# Patient Record
Sex: FEMALE | Race: OTHER | NOT HISPANIC OR LATINO
[De-identification: names, ages, dates, MRNs, and addresses within clinical notes are randomized per-mention and may not be internally consistent; named-entity substitution may affect disease eponyms.]

---

## 2020-03-10 PROBLEM — Z00.00 ENCOUNTER FOR PREVENTIVE HEALTH EXAMINATION: Status: ACTIVE | Noted: 2020-03-10

## 2020-03-17 ENCOUNTER — APPOINTMENT (OUTPATIENT)
Dept: ANTEPARTUM | Facility: CLINIC | Age: 35
End: 2020-03-17
Payer: COMMERCIAL

## 2020-03-17 PROCEDURE — 76813 OB US NUCHAL MEAS 1 GEST: CPT

## 2020-04-14 ENCOUNTER — APPOINTMENT (OUTPATIENT)
Dept: ANTEPARTUM | Facility: CLINIC | Age: 35
End: 2020-04-14
Payer: COMMERCIAL

## 2020-04-14 PROCEDURE — 76811 OB US DETAILED SNGL FETUS: CPT

## 2020-05-19 ENCOUNTER — APPOINTMENT (OUTPATIENT)
Dept: ANTEPARTUM | Facility: CLINIC | Age: 35
End: 2020-05-19
Payer: COMMERCIAL

## 2020-05-19 PROCEDURE — 76816 OB US FOLLOW-UP PER FETUS: CPT

## 2020-07-09 ENCOUNTER — APPOINTMENT (OUTPATIENT)
Dept: ANTEPARTUM | Facility: CLINIC | Age: 35
End: 2020-07-09
Payer: COMMERCIAL

## 2020-07-09 PROCEDURE — 76816 OB US FOLLOW-UP PER FETUS: CPT

## 2020-07-27 ENCOUNTER — APPOINTMENT (OUTPATIENT)
Dept: ANTEPARTUM | Facility: CLINIC | Age: 35
End: 2020-07-27
Payer: COMMERCIAL

## 2020-07-27 PROCEDURE — 76819 FETAL BIOPHYS PROFIL W/O NST: CPT

## 2020-08-06 ENCOUNTER — APPOINTMENT (OUTPATIENT)
Dept: ANTEPARTUM | Facility: CLINIC | Age: 35
End: 2020-08-06
Payer: COMMERCIAL

## 2020-08-06 PROCEDURE — 76816 OB US FOLLOW-UP PER FETUS: CPT

## 2020-08-20 ENCOUNTER — APPOINTMENT (OUTPATIENT)
Dept: ANTEPARTUM | Facility: CLINIC | Age: 35
End: 2020-08-20
Payer: COMMERCIAL

## 2020-08-20 PROCEDURE — 76818 FETAL BIOPHYS PROFILE W/NST: CPT

## 2020-08-27 ENCOUNTER — APPOINTMENT (OUTPATIENT)
Dept: ANTEPARTUM | Facility: CLINIC | Age: 35
End: 2020-08-27
Payer: COMMERCIAL

## 2020-08-27 PROCEDURE — 76819 FETAL BIOPHYS PROFIL W/O NST: CPT

## 2020-09-03 ENCOUNTER — APPOINTMENT (OUTPATIENT)
Dept: ANTEPARTUM | Facility: CLINIC | Age: 35
End: 2020-09-03
Payer: COMMERCIAL

## 2020-09-03 PROCEDURE — 76817 TRANSVAGINAL US OBSTETRIC: CPT

## 2020-09-10 ENCOUNTER — APPOINTMENT (OUTPATIENT)
Dept: ANTEPARTUM | Facility: CLINIC | Age: 35
End: 2020-09-10
Payer: COMMERCIAL

## 2020-09-10 PROCEDURE — 76816 OB US FOLLOW-UP PER FETUS: CPT

## 2020-09-17 ENCOUNTER — OUTPATIENT (OUTPATIENT)
Dept: OUTPATIENT SERVICES | Facility: HOSPITAL | Age: 35
LOS: 1 days | End: 2020-09-17
Payer: COMMERCIAL

## 2020-09-17 ENCOUNTER — APPOINTMENT (OUTPATIENT)
Dept: ANTEPARTUM | Facility: CLINIC | Age: 35
End: 2020-09-17
Payer: COMMERCIAL

## 2020-09-17 DIAGNOSIS — O26.899 OTHER SPECIFIED PREGNANCY RELATED CONDITIONS, UNSPECIFIED TRIMESTER: ICD-10-CM

## 2020-09-17 DIAGNOSIS — Z3A.00 WEEKS OF GESTATION OF PREGNANCY NOT SPECIFIED: ICD-10-CM

## 2020-09-17 PROCEDURE — 99203 OFFICE O/P NEW LOW 30 MIN: CPT

## 2020-09-17 PROCEDURE — 76819 FETAL BIOPHYS PROFIL W/O NST: CPT

## 2020-09-17 PROCEDURE — 76981 USE PARENCHYMA: CPT | Mod: 26

## 2020-09-17 PROCEDURE — 99214 OFFICE O/P EST MOD 30 MIN: CPT

## 2020-09-21 ENCOUNTER — OUTPATIENT (OUTPATIENT)
Dept: OUTPATIENT SERVICES | Facility: HOSPITAL | Age: 35
LOS: 1 days | End: 2020-09-21
Payer: COMMERCIAL

## 2020-09-21 DIAGNOSIS — Z01.818 ENCOUNTER FOR OTHER PREPROCEDURAL EXAMINATION: ICD-10-CM

## 2020-09-21 LAB
APTT BLD: 23.8 SEC — LOW (ref 27.5–35.5)
BLD GP AB SCN SERPL QL: NEGATIVE — SIGNIFICANT CHANGE UP
BLD GP AB SCN SERPL QL: NEGATIVE — SIGNIFICANT CHANGE UP
HCT VFR BLD CALC: 37.9 % — SIGNIFICANT CHANGE UP (ref 34.5–45)
HGB BLD-MCNC: 12.5 G/DL — SIGNIFICANT CHANGE UP (ref 11.5–15.5)
INR BLD: 0.9 — SIGNIFICANT CHANGE UP (ref 0.88–1.16)
MCHC RBC-ENTMCNC: 29.8 PG — SIGNIFICANT CHANGE UP (ref 27–34)
MCHC RBC-ENTMCNC: 33 GM/DL — SIGNIFICANT CHANGE UP (ref 32–36)
MCV RBC AUTO: 90.2 FL — SIGNIFICANT CHANGE UP (ref 80–100)
NRBC # BLD: 0 /100 WBCS — SIGNIFICANT CHANGE UP (ref 0–0)
PLATELET # BLD AUTO: 268 K/UL — SIGNIFICANT CHANGE UP (ref 150–400)
PROTHROM AB SERPL-ACNC: 10.8 SEC — SIGNIFICANT CHANGE UP (ref 10.6–13.6)
RBC # BLD: 4.2 M/UL — SIGNIFICANT CHANGE UP (ref 3.8–5.2)
RBC # FLD: 13.7 % — SIGNIFICANT CHANGE UP (ref 10.3–14.5)
RH IG SCN BLD-IMP: POSITIVE — SIGNIFICANT CHANGE UP
RH IG SCN BLD-IMP: POSITIVE — SIGNIFICANT CHANGE UP
WBC # BLD: 8.05 K/UL — SIGNIFICANT CHANGE UP (ref 3.8–10.5)
WBC # FLD AUTO: 8.05 K/UL — SIGNIFICANT CHANGE UP (ref 3.8–10.5)

## 2020-09-21 PROCEDURE — 86900 BLOOD TYPING SEROLOGIC ABO: CPT

## 2020-09-21 PROCEDURE — 85730 THROMBOPLASTIN TIME PARTIAL: CPT

## 2020-09-21 PROCEDURE — 86850 RBC ANTIBODY SCREEN: CPT

## 2020-09-21 PROCEDURE — 85610 PROTHROMBIN TIME: CPT

## 2020-09-21 PROCEDURE — 86901 BLOOD TYPING SEROLOGIC RH(D): CPT

## 2020-09-21 PROCEDURE — 85027 COMPLETE CBC AUTOMATED: CPT

## 2020-09-22 ENCOUNTER — TRANSCRIPTION ENCOUNTER (OUTPATIENT)
Age: 35
End: 2020-09-22

## 2020-09-23 ENCOUNTER — RESULT REVIEW (OUTPATIENT)
Age: 35
End: 2020-09-23

## 2020-09-23 ENCOUNTER — INPATIENT (INPATIENT)
Facility: HOSPITAL | Age: 35
LOS: 3 days | Discharge: HOME HEALTH | End: 2020-09-27
Attending: OBSTETRICS & GYNECOLOGY | Admitting: OBSTETRICS & GYNECOLOGY
Payer: COMMERCIAL

## 2020-09-23 VITALS — HEIGHT: 66 IN | WEIGHT: 163.14 LBS

## 2020-09-23 LAB — GLUCOSE BLDC GLUCOMTR-MCNC: 83 MG/DL — SIGNIFICANT CHANGE UP (ref 70–99)

## 2020-09-23 PROCEDURE — 88307 TISSUE EXAM BY PATHOLOGIST: CPT | Mod: 26

## 2020-09-23 RX ORDER — ONDANSETRON 8 MG/1
4 TABLET, FILM COATED ORAL EVERY 6 HOURS
Refills: 0 | Status: DISCONTINUED | OUTPATIENT
Start: 2020-09-23 | End: 2020-09-27

## 2020-09-23 RX ORDER — FAMOTIDINE 10 MG/ML
20 INJECTION INTRAVENOUS ONCE
Refills: 0 | Status: COMPLETED | OUTPATIENT
Start: 2020-09-23 | End: 2020-09-23

## 2020-09-23 RX ORDER — SODIUM CHLORIDE 9 MG/ML
1000 INJECTION, SOLUTION INTRAVENOUS ONCE
Refills: 0 | Status: COMPLETED | OUTPATIENT
Start: 2020-09-23 | End: 2020-09-23

## 2020-09-23 RX ORDER — OXYCODONE HYDROCHLORIDE 5 MG/1
5 TABLET ORAL ONCE
Refills: 0 | Status: DISCONTINUED | OUTPATIENT
Start: 2020-09-23 | End: 2020-09-27

## 2020-09-23 RX ORDER — MORPHINE SULFATE 50 MG/1
0.3 CAPSULE, EXTENDED RELEASE ORAL ONCE
Refills: 0 | Status: DISCONTINUED | OUTPATIENT
Start: 2020-09-23 | End: 2020-09-27

## 2020-09-23 RX ORDER — NALOXONE HYDROCHLORIDE 4 MG/.1ML
0.1 SPRAY NASAL
Refills: 0 | Status: DISCONTINUED | OUTPATIENT
Start: 2020-09-23 | End: 2020-09-27

## 2020-09-23 RX ORDER — SODIUM CHLORIDE 9 MG/ML
1000 INJECTION, SOLUTION INTRAVENOUS
Refills: 0 | Status: DISCONTINUED | OUTPATIENT
Start: 2020-09-23 | End: 2020-09-27

## 2020-09-23 RX ORDER — LANOLIN
1 OINTMENT (GRAM) TOPICAL EVERY 6 HOURS
Refills: 0 | Status: DISCONTINUED | OUTPATIENT
Start: 2020-09-23 | End: 2020-09-27

## 2020-09-23 RX ORDER — IBUPROFEN 200 MG
600 TABLET ORAL EVERY 6 HOURS
Refills: 0 | Status: COMPLETED | OUTPATIENT
Start: 2020-09-23 | End: 2021-08-22

## 2020-09-23 RX ORDER — ACETAMINOPHEN 500 MG
975 TABLET ORAL EVERY 6 HOURS
Refills: 0 | Status: DISCONTINUED | OUTPATIENT
Start: 2020-09-23 | End: 2020-09-27

## 2020-09-23 RX ORDER — METOCLOPRAMIDE HCL 10 MG
10 TABLET ORAL ONCE
Refills: 0 | Status: DISCONTINUED | OUTPATIENT
Start: 2020-09-23 | End: 2020-09-23

## 2020-09-23 RX ORDER — OXYTOCIN 10 UNIT/ML
333.33 VIAL (ML) INJECTION
Qty: 20 | Refills: 0 | Status: DISCONTINUED | OUTPATIENT
Start: 2020-09-23 | End: 2020-09-27

## 2020-09-23 RX ORDER — CEFAZOLIN SODIUM 1 G
2000 VIAL (EA) INJECTION ONCE
Refills: 0 | Status: COMPLETED | OUTPATIENT
Start: 2020-09-23 | End: 2020-09-23

## 2020-09-23 RX ORDER — OXYTOCIN 10 UNIT/ML
333.33 VIAL (ML) INJECTION
Qty: 20 | Refills: 0 | Status: DISCONTINUED | OUTPATIENT
Start: 2020-09-23 | End: 2020-09-23

## 2020-09-23 RX ORDER — SIMETHICONE 80 MG/1
80 TABLET, CHEWABLE ORAL EVERY 4 HOURS
Refills: 0 | Status: DISCONTINUED | OUTPATIENT
Start: 2020-09-23 | End: 2020-09-27

## 2020-09-23 RX ORDER — DEXAMETHASONE 0.5 MG/5ML
4 ELIXIR ORAL EVERY 6 HOURS
Refills: 0 | Status: DISCONTINUED | OUTPATIENT
Start: 2020-09-23 | End: 2020-09-27

## 2020-09-23 RX ORDER — CITRIC ACID/SODIUM CITRATE 300-500 MG
30 SOLUTION, ORAL ORAL ONCE
Refills: 0 | Status: COMPLETED | OUTPATIENT
Start: 2020-09-23 | End: 2020-09-23

## 2020-09-23 RX ORDER — DIPHENHYDRAMINE HCL 50 MG
25 CAPSULE ORAL EVERY 6 HOURS
Refills: 0 | Status: DISCONTINUED | OUTPATIENT
Start: 2020-09-23 | End: 2020-09-27

## 2020-09-23 RX ORDER — ENOXAPARIN SODIUM 100 MG/ML
40 INJECTION SUBCUTANEOUS EVERY 24 HOURS
Refills: 0 | Status: DISCONTINUED | OUTPATIENT
Start: 2020-09-24 | End: 2020-09-27

## 2020-09-23 RX ORDER — MAGNESIUM HYDROXIDE 400 MG/1
30 TABLET, CHEWABLE ORAL
Refills: 0 | Status: DISCONTINUED | OUTPATIENT
Start: 2020-09-23 | End: 2020-09-27

## 2020-09-23 RX ORDER — OXYCODONE HYDROCHLORIDE 5 MG/1
5 TABLET ORAL
Refills: 0 | Status: COMPLETED | OUTPATIENT
Start: 2020-09-23 | End: 2020-09-30

## 2020-09-23 RX ORDER — TETANUS TOXOID, REDUCED DIPHTHERIA TOXOID AND ACELLULAR PERTUSSIS VACCINE, ADSORBED 5; 2.5; 8; 8; 2.5 [IU]/.5ML; [IU]/.5ML; UG/.5ML; UG/.5ML; UG/.5ML
0.5 SUSPENSION INTRAMUSCULAR ONCE
Refills: 0 | Status: DISCONTINUED | OUTPATIENT
Start: 2020-09-23 | End: 2020-09-27

## 2020-09-23 RX ORDER — KETOROLAC TROMETHAMINE 30 MG/ML
30 SYRINGE (ML) INJECTION EVERY 6 HOURS
Refills: 0 | Status: DISCONTINUED | OUTPATIENT
Start: 2020-09-23 | End: 2020-09-24

## 2020-09-23 RX ORDER — SODIUM CHLORIDE 9 MG/ML
1000 INJECTION, SOLUTION INTRAVENOUS
Refills: 0 | Status: DISCONTINUED | OUTPATIENT
Start: 2020-09-23 | End: 2020-09-23

## 2020-09-23 RX ADMIN — Medication 975 MILLIGRAM(S): at 17:16

## 2020-09-23 RX ADMIN — Medication 1000 MILLIUNIT(S)/MIN: at 14:46

## 2020-09-23 RX ADMIN — SODIUM CHLORIDE 125 MILLILITER(S): 9 INJECTION, SOLUTION INTRAVENOUS at 13:31

## 2020-09-23 RX ADMIN — SODIUM CHLORIDE 2000 MILLILITER(S): 9 INJECTION, SOLUTION INTRAVENOUS at 12:30

## 2020-09-23 RX ADMIN — FAMOTIDINE 20 MILLIGRAM(S): 10 INJECTION INTRAVENOUS at 13:38

## 2020-09-23 RX ADMIN — Medication 100 MILLIGRAM(S): at 13:47

## 2020-09-23 RX ADMIN — Medication 30 MILLIGRAM(S): at 16:50

## 2020-09-23 RX ADMIN — Medication 30 MILLILITER(S): at 13:47

## 2020-09-23 RX ADMIN — Medication 30 MILLIGRAM(S): at 23:37

## 2020-09-23 RX ADMIN — Medication 1000 MILLIUNIT(S)/MIN: at 14:19

## 2020-09-23 NOTE — LACTATION INITIAL EVALUATION - LACTATION INTERVENTIONS
You may take over the counter ibuprofen 400mg (2 tabs) to 800mg (4 tabs) up to every 6 hours as needed for pain/inflammation OR naproxen 220mg (1 tab) to 440mg (2 tabs) every 12 hours as needed for pain/inflammation.  If you are taking the higher dosages, take with some food.  Also, avoid taking the higher dosage of ibuprofren every 6 or naproxen every 12 hours for more than 48-72 hours.  If you start to have abdominal pain, dark stools, or vomiting after doses, stop taking this medication immediately and contact a physician.  Do not take any other NSAIDs (non-steroidal anti-inflammatory drugs) in addition to your ibuprofen.  If you are unsure if a medication is an NSAID you may ask your pharmacist or call the office.        Below is a list of free Apps that you may find helpful (some of them may not apply to you since this is a general list of helpful Apps):    Android & Apple users:  EatFit - Created by [facility] [employer].  Tons of great recipes, links to >100 restaurants in town with healthy choices, and shopping guides.    Fooducate - Helps with healthy diet and weight loss  Shop Well - Scan barcodes to foods to see if it is healthy or not.  It will also suggest healthier alternatives  Lose It - Calorie tracking and goal setting for weight loss.  Peer support is also available  Calorie Counter and Diet Tracker by MyFitnessPal - Helps count calories for food intake and calories burned during exercise  Popsugar Active - has pictures and videos of preloaded workout routines  [facility] Diabetes Pal - log for home sugars, diet, weight, and blood pressure  Headspace - Guides your through meditation.  The first 10 programs are free.        Cetaphil is a good face moisturizer and daily face wash.      Any brand acne face wash is good for when you have breakouts.    
initiate skin to skin/Provided breastfeeding education/initiate hand expression routine

## 2020-09-23 NOTE — LACTATION INITIAL EVALUATION - NS LACT CON REASON FOR REQ
Infant about 8 hours old at time of consult. Mother is now . Mother had GDM that was diet controlled. Infant is a 40 wk female delivered via c/s. Infant is LGA and chem strips have been WNL. Infant has stooled and is due to void, assisted father with changing infant’s diaper during this visit. Assisted mother with latching infant via the football hold on the right breast and infant was able to latch well with a wide open mouth and lips flanged. She was observed to have an organized, rhythmic suck and mother denied pain with the feeding. Provided complete breastfeeding education. Demonstrated breast massage and hand expression and mother was able to return demo with colostrum noted. Reviewed pertinent information in the "A New Beginning" booklet. Mother has a breast pump for home use. Mother verbalized understanding of all information and denied having questions/concerns at this time. Informed about lactation availability.

## 2020-09-24 LAB
ALBUMIN SERPL ELPH-MCNC: 2.6 G/DL — LOW (ref 3.3–5)
ALP SERPL-CCNC: 144 U/L — HIGH (ref 40–120)
ALT FLD-CCNC: 8 U/L — LOW (ref 10–45)
ANION GAP SERPL CALC-SCNC: 10 MMOL/L — SIGNIFICANT CHANGE UP (ref 5–17)
APTT BLD: 25.8 SEC — LOW (ref 27.5–35.5)
APTT BLD: 29.5 SEC — SIGNIFICANT CHANGE UP (ref 27.5–35.5)
AST SERPL-CCNC: 16 U/L — SIGNIFICANT CHANGE UP (ref 10–40)
BASOPHILS # BLD AUTO: 0.02 K/UL — SIGNIFICANT CHANGE UP (ref 0–0.2)
BASOPHILS NFR BLD AUTO: 0.2 % — SIGNIFICANT CHANGE UP (ref 0–2)
BILIRUB SERPL-MCNC: <0.2 MG/DL — SIGNIFICANT CHANGE UP (ref 0.2–1.2)
BUN SERPL-MCNC: 13 MG/DL — SIGNIFICANT CHANGE UP (ref 7–23)
CALCIUM SERPL-MCNC: 8.6 MG/DL — SIGNIFICANT CHANGE UP (ref 8.4–10.5)
CHLORIDE SERPL-SCNC: 104 MMOL/L — SIGNIFICANT CHANGE UP (ref 96–108)
CO2 SERPL-SCNC: 21 MMOL/L — LOW (ref 22–31)
CREAT SERPL-MCNC: 0.81 MG/DL — SIGNIFICANT CHANGE UP (ref 0.5–1.3)
EOSINOPHIL # BLD AUTO: 0.02 K/UL — SIGNIFICANT CHANGE UP (ref 0–0.5)
EOSINOPHIL NFR BLD AUTO: 0.2 % — SIGNIFICANT CHANGE UP (ref 0–6)
FIBRINOGEN PPP-MCNC: 457 MG/DL — HIGH (ref 258–438)
FIBRINOGEN PPP-MCNC: 471 MG/DL — HIGH (ref 258–438)
GLUCOSE BLDC GLUCOMTR-MCNC: 114 MG/DL — HIGH (ref 70–99)
GLUCOSE SERPL-MCNC: 108 MG/DL — HIGH (ref 70–99)
HCT VFR BLD CALC: 31.5 % — LOW (ref 34.5–45)
HCT VFR BLD CALC: 31.6 % — LOW (ref 34.5–45)
HCT VFR BLD CALC: 31.9 % — LOW (ref 34.5–45)
HGB BLD-MCNC: 10.3 G/DL — LOW (ref 11.5–15.5)
HGB BLD-MCNC: 10.5 G/DL — LOW (ref 11.5–15.5)
HGB BLD-MCNC: 10.5 G/DL — LOW (ref 11.5–15.5)
IMM GRANULOCYTES NFR BLD AUTO: 0.9 % — SIGNIFICANT CHANGE UP (ref 0–1.5)
INR BLD: 0.88 — SIGNIFICANT CHANGE UP (ref 0.88–1.16)
INR BLD: 0.88 — SIGNIFICANT CHANGE UP (ref 0.88–1.16)
LYMPHOCYTES # BLD AUTO: 1.36 K/UL — SIGNIFICANT CHANGE UP (ref 1–3.3)
LYMPHOCYTES # BLD AUTO: 13.7 % — SIGNIFICANT CHANGE UP (ref 13–44)
MCHC RBC-ENTMCNC: 29.6 PG — SIGNIFICANT CHANGE UP (ref 27–34)
MCHC RBC-ENTMCNC: 29.7 PG — SIGNIFICANT CHANGE UP (ref 27–34)
MCHC RBC-ENTMCNC: 30.3 PG — SIGNIFICANT CHANGE UP (ref 27–34)
MCHC RBC-ENTMCNC: 32.6 GM/DL — SIGNIFICANT CHANGE UP (ref 32–36)
MCHC RBC-ENTMCNC: 32.9 GM/DL — SIGNIFICANT CHANGE UP (ref 32–36)
MCHC RBC-ENTMCNC: 33.3 GM/DL — SIGNIFICANT CHANGE UP (ref 32–36)
MCV RBC AUTO: 90.4 FL — SIGNIFICANT CHANGE UP (ref 80–100)
MCV RBC AUTO: 90.8 FL — SIGNIFICANT CHANGE UP (ref 80–100)
MCV RBC AUTO: 91 FL — SIGNIFICANT CHANGE UP (ref 80–100)
MONOCYTES # BLD AUTO: 0.67 K/UL — SIGNIFICANT CHANGE UP (ref 0–0.9)
MONOCYTES NFR BLD AUTO: 6.8 % — SIGNIFICANT CHANGE UP (ref 2–14)
NEUTROPHILS # BLD AUTO: 7.75 K/UL — HIGH (ref 1.8–7.4)
NEUTROPHILS NFR BLD AUTO: 78.2 % — HIGH (ref 43–77)
NRBC # BLD: 0 /100 WBCS — SIGNIFICANT CHANGE UP (ref 0–0)
PLATELET # BLD AUTO: 217 K/UL — SIGNIFICANT CHANGE UP (ref 150–400)
PLATELET # BLD AUTO: 248 K/UL — SIGNIFICANT CHANGE UP (ref 150–400)
PLATELET # BLD AUTO: 255 K/UL — SIGNIFICANT CHANGE UP (ref 150–400)
POTASSIUM SERPL-MCNC: 4.4 MMOL/L — SIGNIFICANT CHANGE UP (ref 3.5–5.3)
POTASSIUM SERPL-SCNC: 4.4 MMOL/L — SIGNIFICANT CHANGE UP (ref 3.5–5.3)
PROT SERPL-MCNC: 5.2 G/DL — LOW (ref 6–8.3)
PROTHROM AB SERPL-ACNC: 10.6 SEC — SIGNIFICANT CHANGE UP (ref 10.6–13.6)
PROTHROM AB SERPL-ACNC: 10.6 SEC — SIGNIFICANT CHANGE UP (ref 10.6–13.6)
RBC # BLD: 3.46 M/UL — LOW (ref 3.8–5.2)
RBC # BLD: 3.48 M/UL — LOW (ref 3.8–5.2)
RBC # BLD: 3.53 M/UL — LOW (ref 3.8–5.2)
RBC # FLD: 13.4 % — SIGNIFICANT CHANGE UP (ref 10.3–14.5)
RBC # FLD: 13.5 % — SIGNIFICANT CHANGE UP (ref 10.3–14.5)
RBC # FLD: 13.7 % — SIGNIFICANT CHANGE UP (ref 10.3–14.5)
SARS-COV-2 IGG SERPL QL IA: NEGATIVE — SIGNIFICANT CHANGE UP
SARS-COV-2 IGM SERPL IA-ACNC: <0.1 INDEX — SIGNIFICANT CHANGE UP
SODIUM SERPL-SCNC: 135 MMOL/L — SIGNIFICANT CHANGE UP (ref 135–145)
T PALLIDUM AB TITR SER: NEGATIVE — SIGNIFICANT CHANGE UP
WBC # BLD: 10.25 K/UL — SIGNIFICANT CHANGE UP (ref 3.8–10.5)
WBC # BLD: 9.16 K/UL — SIGNIFICANT CHANGE UP (ref 3.8–10.5)
WBC # BLD: 9.91 K/UL — SIGNIFICANT CHANGE UP (ref 3.8–10.5)
WBC # FLD AUTO: 10.25 K/UL — SIGNIFICANT CHANGE UP (ref 3.8–10.5)
WBC # FLD AUTO: 9.16 K/UL — SIGNIFICANT CHANGE UP (ref 3.8–10.5)
WBC # FLD AUTO: 9.91 K/UL — SIGNIFICANT CHANGE UP (ref 3.8–10.5)

## 2020-09-24 RX ORDER — SODIUM CHLORIDE 9 MG/ML
1000 INJECTION INTRAMUSCULAR; INTRAVENOUS; SUBCUTANEOUS ONCE
Refills: 0 | Status: COMPLETED | OUTPATIENT
Start: 2020-09-24 | End: 2020-09-24

## 2020-09-24 RX ORDER — SODIUM CHLORIDE 9 MG/ML
1000 INJECTION, SOLUTION INTRAVENOUS ONCE
Refills: 0 | Status: COMPLETED | OUTPATIENT
Start: 2020-09-24 | End: 2020-09-24

## 2020-09-24 RX ORDER — OXYCODONE HYDROCHLORIDE 5 MG/1
5 TABLET ORAL ONCE
Refills: 0 | Status: DISCONTINUED | OUTPATIENT
Start: 2020-09-24 | End: 2020-09-24

## 2020-09-24 RX ORDER — OXYCODONE HYDROCHLORIDE 5 MG/1
5 TABLET ORAL ONCE
Refills: 0 | Status: DISCONTINUED | OUTPATIENT
Start: 2020-09-24 | End: 2020-09-25

## 2020-09-24 RX ORDER — IBUPROFEN 200 MG
600 TABLET ORAL EVERY 6 HOURS
Refills: 0 | Status: DISCONTINUED | OUTPATIENT
Start: 2020-09-24 | End: 2020-09-27

## 2020-09-24 RX ADMIN — OXYCODONE HYDROCHLORIDE 5 MILLIGRAM(S): 5 TABLET ORAL at 20:19

## 2020-09-24 RX ADMIN — OXYCODONE HYDROCHLORIDE 5 MILLIGRAM(S): 5 TABLET ORAL at 19:22

## 2020-09-24 RX ADMIN — Medication 975 MILLIGRAM(S): at 23:10

## 2020-09-24 RX ADMIN — Medication 600 MILLIGRAM(S): at 21:13

## 2020-09-24 RX ADMIN — Medication 975 MILLIGRAM(S): at 12:00

## 2020-09-24 RX ADMIN — Medication 600 MILLIGRAM(S): at 15:00

## 2020-09-24 RX ADMIN — Medication 30 MILLIGRAM(S): at 05:38

## 2020-09-24 RX ADMIN — SODIUM CHLORIDE 1000 MILLILITER(S): 9 INJECTION, SOLUTION INTRAVENOUS at 23:54

## 2020-09-24 RX ADMIN — SODIUM CHLORIDE 1000 MILLILITER(S): 9 INJECTION INTRAMUSCULAR; INTRAVENOUS; SUBCUTANEOUS at 12:00

## 2020-09-24 RX ADMIN — Medication 600 MILLIGRAM(S): at 15:45

## 2020-09-24 RX ADMIN — Medication 975 MILLIGRAM(S): at 13:00

## 2020-09-24 RX ADMIN — Medication 975 MILLIGRAM(S): at 01:00

## 2020-09-24 RX ADMIN — Medication 975 MILLIGRAM(S): at 06:31

## 2020-09-24 RX ADMIN — Medication 975 MILLIGRAM(S): at 07:30

## 2020-09-24 RX ADMIN — Medication 600 MILLIGRAM(S): at 22:00

## 2020-09-24 RX ADMIN — ENOXAPARIN SODIUM 40 MILLIGRAM(S): 100 INJECTION SUBCUTANEOUS at 15:00

## 2020-09-24 RX ADMIN — Medication 30 MILLIGRAM(S): at 04:38

## 2020-09-24 RX ADMIN — Medication 30 MILLIGRAM(S): at 09:00

## 2020-09-24 RX ADMIN — Medication 975 MILLIGRAM(S): at 18:07

## 2020-09-24 RX ADMIN — Medication 975 MILLIGRAM(S): at 00:03

## 2020-09-24 RX ADMIN — Medication 30 MILLIGRAM(S): at 00:30

## 2020-09-24 RX ADMIN — Medication 975 MILLIGRAM(S): at 19:00

## 2020-09-24 RX ADMIN — Medication 30 MILLIGRAM(S): at 10:00

## 2020-09-24 NOTE — CHART NOTE - NSCHARTNOTEFT_GEN_A_CORE
Called to bedside for patient dizziness while in the bathroom. Arrived to see patient sitting on the toilet, alert and oriented, but pale. Moved patient wheelchair and wheeled back to bed. Once in bed, patient regained color and felt better. Senior resident Judy also at bedside. Patient denied increased bleeding, fundus was firm and uterine massage produced no further bleeding. Ordered stat labs and 1L bolus. Attending aware.    Minerva Lawton MD PGY1  DWJOSE RAMON Kim PGY2

## 2020-09-24 NOTE — CHART NOTE - NSCHARTNOTEFT_GEN_A_CORE
Patient evaluated at bedside for feeling dizzy earlier when she stood up, BP 82/52 and HR 64. She is back and bed now and feeling well, denies dizziness/lightheadedness, headache, heavy vaginal bleeding, and pain is well controlled. Fundus firm below umbilicus, lochia wnl, capillary refill wnl. Post op Hb this AM 10.5 (from 12.5). She is now POD1 s/p primary c/s for macrosomia c/b EBL 1000 s/p methergine x1 and pitocin for atony. Encouraged PO hydration and good PO intake. Will continue to monitor for now and reassess this afternoon. F/u TOV. Patient evaluated at bedside for feeling dizzy earlier when she stood up, BP 82/52 and HR 64. She is back and bed now and feeling well, denies dizziness/lightheadedness, headache, heavy vaginal bleeding, and pain is well controlled. Fundus firm below umbilicus, lochia wnl, capillary refill wnl. Post op Hb this AM 10.5 (from 12.5). She is now POD1 s/p primary c/s for macrosomia c/b EBL 1000 s/p methergine x1 and pitocin for atony. Encouraged PO hydration and good PO intake. Will continue to monitor for now and reassess this afternoon. F/u TOV.    d/w Dr Hanna      **ADDENDUM at 12:35**    Called to patient's bedside for presyncopal episode while patient got up to go to the bathroom. Lowered patient to wheelchair and brought her back to bed. She again c/o feeling dizzy and lightheaded though denies any heavy vaginal bleeding, lochia wnl. STAT BP 89/50, HR 64, fundus firm below umbilicus. Will draw STAT CBC and coags now and give IL IV fluids bolus.    d/w Dr Hanna Patient evaluated at bedside for feeling dizzy earlier when she stood up, BP 82/52 and HR 64. She is back and bed now and feeling well, denies dizziness/lightheadedness, headache, heavy vaginal bleeding, and pain is well controlled. Fundus firm below umbilicus, lochia wnl, capillary refill wnl. Post op Hb this AM 10.5 (from 12.5). She is now POD1 s/p primary c/s for macrosomia c/b EBL 1000 s/p methergine x1 and pitocin for atony. Encouraged PO hydration and good PO intake. Will continue to monitor for now and reassess this afternoon. F/u TOV.    d/w Dr Hanna      **ADDENDUM at 12:35**    Called to patient's bedside for presyncopal episode while patient got up to go to the bathroom. Lowered patient to wheelchair and brought her back to bed. She again c/o feeling dizzy and lightheaded though denies any heavy vaginal bleeding, lochia wnl. STAT BP 89/50, HR 64, fundus firm below umbilicus. Will draw STAT CBC and coags now and give IL IV fluids bolus.    d/w Dr Hanna      **ADDENDUM at 14:00***  Hb stable at 10.5 (from 10.5)

## 2020-09-24 NOTE — PROGRESS NOTE ADULT - SUBJECTIVE AND OBJECTIVE BOX
Patient evaluated at bedside. No acute events overnight. She reports pain is well controlled with OPM. Adequate urine output. Positive flatus. Patient feels dizzy/lightheaded with ambulation      Physical Exam:  Vital Signs Last 24 Hrs  T(C): 36.7 (24 Sep 2020 10:00), Max: 36.7 (23 Sep 2020 22:00)  T(F): 98.1 (24 Sep 2020 10:00), Max: 98.1 (24 Sep 2020 06:00)  HR: 67 (24 Sep 2020 10:00) (60 - 82)  BP: 88/51 (24 Sep 2020 10:30) (82/52 - 118/67)  BP(mean): --  RR: 18 (24 Sep 2020 07:31) (17 - 18)  SpO2: 96% (24 Sep 2020 10:00) (95% - 99%)    GA: NAD, A+0 x 3  Abd: + BS, soft, nontender, nondistended, no rebound or guarding, uterus firm at midline, 2 fb below umbilicus  Incision clean, dry and intact  : lochia WNL  Extremities: no swelling or calf tenderness                            10.5   9.91  )-----------( 217      ( 24 Sep 2020 06:07 )             31.9         A/P  yo 35y s/p c/s, POD #1,stable    Diet: regular  Pain: OPM  OOB/SCDs/IS  Continue to monitor  Anticipate discharge POD #3 or #4

## 2020-09-24 NOTE — PROGRESS NOTE ADULT - SUBJECTIVE AND OBJECTIVE BOX
Patient is a 35y y.o. F now POD #1 s/p pLTCS for suspected macrosomia.    ANABELO. Patient was evaluated at bedside this AM. Pain is well-controlled with PO pain medications. She has not yet been ambulating as suarez is in place. She endorses decreasing vaginal bleeding. Passing gas. Of note, patient had low BP but normal HR this AM. Denies chest pain, SOB, lightheadness, dizziness, and states she overall feels well.    Vital Signs Last 24 Hrs  T(C): 36.7 (24 Sep 2020 02:30), Max: 36.7 (23 Sep 2020 22:00)  T(F): 98 (24 Sep 2020 02:30), Max: 98 (23 Sep 2020 22:00)  HR: 60 (24 Sep 2020 02:30) (60 - 82)  BP: 86/52 (24 Sep 2020 02:30) (86/52 - 118/67)  BP(mean): --  RR: 18 (24 Sep 2020 02:30) (17 - 18)  SpO2: 95% (24 Sep 2020 02:30) (95% - 99%)    Physical Exam  Gen: Well-appearing. No acute distress. Resting comfortably in bed.  Resp: Breathing comfortably on RA.  Abd: Soft, non-tender, non-distended. Uterus firm at umbilicus.  Incision: Steri strips C/D/I  Extremities: No calf tenderness.    Labs                          10.5   9.91  )-----------( 217      ( 24 Sep 2020 06:07 )             31.9                   09-23-20 @ 07:01  -  09-24-20 @ 06:24  --------------------------------------------------------  IN: 3800 mL / OUT: 1810 mL / NET: 1990 mL

## 2020-09-24 NOTE — PROGRESS NOTE ADULT - ASSESSMENT
Assessment/Plan    35y y.o. now POD#1  s/p pLTCS for suspected macrosomia. AfVSS. Patient is progressing toward all post-op milestones. Pain is well-controlled on PO medications. Anticipate discharge tomorrow.    1. Pain  - Well-controlled on Motrin and Tylenol ATC    2. GI  - Tolerating regular diet without n/v  - Senna PRN    3.   - Voiding spontaneously without difficulty/pain    4. DVT prophylaxis  - Encouraging ambulation    5. Dispo  - Anticipate dispo POD#2        Minerva Lawton MD PGY1

## 2020-09-25 PROCEDURE — 99222 1ST HOSP IP/OBS MODERATE 55: CPT

## 2020-09-25 RX ORDER — OXYCODONE HYDROCHLORIDE 5 MG/1
5 TABLET ORAL ONCE
Refills: 0 | Status: DISCONTINUED | OUTPATIENT
Start: 2020-09-25 | End: 2020-09-25

## 2020-09-25 RX ADMIN — Medication 600 MILLIGRAM(S): at 09:37

## 2020-09-25 RX ADMIN — Medication 975 MILLIGRAM(S): at 07:00

## 2020-09-25 RX ADMIN — Medication 975 MILLIGRAM(S): at 06:08

## 2020-09-25 RX ADMIN — OXYCODONE HYDROCHLORIDE 5 MILLIGRAM(S): 5 TABLET ORAL at 09:37

## 2020-09-25 RX ADMIN — OXYCODONE HYDROCHLORIDE 5 MILLIGRAM(S): 5 TABLET ORAL at 00:14

## 2020-09-25 RX ADMIN — Medication 600 MILLIGRAM(S): at 16:00

## 2020-09-25 RX ADMIN — Medication 600 MILLIGRAM(S): at 20:49

## 2020-09-25 RX ADMIN — Medication 975 MILLIGRAM(S): at 17:59

## 2020-09-25 RX ADMIN — Medication 600 MILLIGRAM(S): at 02:25

## 2020-09-25 RX ADMIN — Medication 975 MILLIGRAM(S): at 11:55

## 2020-09-25 RX ADMIN — Medication 600 MILLIGRAM(S): at 15:29

## 2020-09-25 RX ADMIN — Medication 600 MILLIGRAM(S): at 21:45

## 2020-09-25 RX ADMIN — Medication 975 MILLIGRAM(S): at 00:00

## 2020-09-25 RX ADMIN — Medication 600 MILLIGRAM(S): at 04:30

## 2020-09-25 RX ADMIN — Medication 975 MILLIGRAM(S): at 12:23

## 2020-09-25 RX ADMIN — ENOXAPARIN SODIUM 40 MILLIGRAM(S): 100 INJECTION SUBCUTANEOUS at 15:32

## 2020-09-25 RX ADMIN — OXYCODONE HYDROCHLORIDE 5 MILLIGRAM(S): 5 TABLET ORAL at 01:00

## 2020-09-25 RX ADMIN — Medication 975 MILLIGRAM(S): at 23:47

## 2020-09-25 RX ADMIN — Medication 600 MILLIGRAM(S): at 10:08

## 2020-09-25 NOTE — CONSULT NOTE ADULT - ATTENDING COMMENTS
I was physically present for the key portions of the evaluation and managemnent (E/M) service provided.  I agree with the above history, physical, and plan which I have reviewed and edited where appropriate, with the exceptions as per my note.    neuro exam normal. pt does not have vertigo. pt had orthostatic hypotension yesterday leading to presyncopal and syncopal episodes. today denies dizziness. no other neuro sx. management of hypotension as per primary team. please call with questions. discussed with team and with pt.

## 2020-09-25 NOTE — CHART NOTE - NSCHARTNOTEFT_GEN_A_CORE
Called to bedside for a presyncopal episode with vital signs as low as 53/34. BP at bedside was BP 81/56, HR 62. Pt in trendelenberg, awake, A&O x 3. Reports feeling dizzy and faint but denies complete LOC. According to nurse, pt was walking to bathroom when pt slumped over and felt limp. Nurse denies that pt feel or hit her head but feels as though she was unresponsive. They brought her back to the bed and placed her in Trendelenburg position.    PE  ICU Vital Signs Last 24 Hrs  T(C): 36.6 (24 Sep 2020 22:28), Max: 37.1 (24 Sep 2020 18:00)  T(F): 97.8 (24 Sep 2020 22:28), Max: 98.8 (24 Sep 2020 18:00)  HR: 62 (25 Sep 2020 00:05) (60 - 84)  BP: 53/34 (25 Sep 2020 00:05) (53/34 - 103/66)  RR: 17 (24 Sep 2020 22:34) (17 - 18)  SpO2: 96% (24 Sep 2020 22:34) (95% - 97%)    Gen: A&Ox3  Card: RRR, nml S1/S2  Resp: no inc. WOB, CTAB, no wheezes or rales  Abd: soft, no rebound/guarding, firm, incision clean/dry/intact,   : minimal lochia/no clots visible, no bleeding/drizzling with deep fundal pressure  Ext: no swelling bilaterally    A/P: Pt is POD2 s/p CS 2/2 suspected macrosomia c/b PPH d/t atony (EBL 1000). Pt received 40pit + 20pit+ 1xmethergine. Pt has had 2 prior episodes of presyncope today and has received 2L LR bolus. CBC since her  has been stable at 10.5 (baseline 12.5). Pt's baseline BPs have been in the 90s/50s so these vital signs are not grossly different for her baseline. N  - STAT CBC, CMP, Coags, Fibrinogen to r/o electrolyte imbalances, coagulopathy or drop in hbg  - IL LR bolus started  - EKG ordered to r/o cardiac etiology  - Orthostatics ordered  - Recommend bed pan rather than walking to bathroom for rest of evening    Dr. Solorio aware  Pearl (PGY3) and Emily (PGY1) at bedside.

## 2020-09-25 NOTE — CONSULT NOTE ADULT - SUBJECTIVE AND OBJECTIVE BOX
NEUROLOGY CONSULT    HPI: Patient is a 35y y.o. F now POD #1 s/p pLTCS for suspected macrosomia.    HOSPITAL COURSE: Pt w/  20 w/ recorded 1L blood loss. Pt w/ 3 episodes of pre-syncope/syncope events w/ hypotension as low as 53/34, s/p 2L LR    SUBJECTIVE: Pt says she is feeling okay. Denies any vertigo (room spinning) or associated dizziness w/ head movements. Says she had a couple of episodes where stood up from a sitting position and immediately felt pain from her  site, and started to feel her b/l vision start to dim and that's when she felt she was going to pass out. Denies any chest pain or palpitations during the episode. She also mentions an instance where she was sitting on the toilet and had just urinated, and passed out completely for a couple of seconds.  says after the episode, she was mentating well without any confusion or urinary/bowel incontinence/tongue biting. Pt mentions she has a hx of passing out 2x a long time ago, once in an elevator and once when she was going to have a needle put in her arm for a blood draw. Denies fever, chills, palpitations. Admits to eating/drinking well, and current vaginal bleeding.    REVIEW OF SYSTEMS: As above     MEDICATIONS  Home Medications:  PNV Prenatal oral tablet: 1 tab(s) orally once a day (23 Sep 2020 12:45)    MEDICATIONS  (STANDING):  acetaminophen   Tablet .. 975 milliGRAM(s) Oral every 6 hours  diphtheria/tetanus/pertussis (acellular) Vaccine (ADAcel) 0.5 milliLiter(s) IntraMuscular once  enoxaparin Injectable 40 milliGRAM(s) SubCutaneous every 24 hours  ibuprofen  Tablet. 600 milliGRAM(s) Oral every 6 hours  lactated ringers. 1000 milliLiter(s) (125 mL/Hr) IV Continuous <Continuous>  morphine PF Spinal 0.3 milliGRAM(s) IntraThecal. once  oxytocin Infusion 333.333 milliUNIT(s)/Min (1000 mL/Hr) IV Continuous <Continuous>    MEDICATIONS  (PRN):  dexAMETHasone  Injectable 4 milliGRAM(s) IV Push every 6 hours PRN Nausea  diphenhydrAMINE 25 milliGRAM(s) Oral every 6 hours PRN Itching  lanolin Ointment 1 Application(s) Topical every 6 hours PRN Sore Nipples  magnesium hydroxide Suspension 30 milliLiter(s) Oral two times a day PRN Constipation  naloxone Injectable 0.1 milliGRAM(s) IV Push every 3 minutes PRN For ANY of the following changes in patient status:  A. Breaths Per Minute LESS THAN 10, B. Oxygen saturation LESS THAN 90%, C. Sedation score of 6 for Stop After: 4 Times  ondansetron Injectable 4 milliGRAM(s) IV Push every 6 hours PRN Nausea  oxyCODONE    IR 5 milliGRAM(s) Oral every 3 hours PRN Moderate to Severe Pain (4-10)  oxyCODONE    IR 5 milliGRAM(s) Oral once PRN Moderate to Severe Pain (4-10)  simethicone 80 milliGRAM(s) Chew every 4 hours PRN Gas      FAMILY HISTORY:    SOCIAL HISTORY: negative for tobacco, alcohol, or ilicit drug use.    Allergies    No Known Allergies    Intolerances            Vital Signs Last 24 Hrs  T(C): 36.7 (25 Sep 2020 14:00), Max: 37.2 (25 Sep 2020 10:44)  T(F): 98.1 (25 Sep 2020 14:00), Max: 98.9 (25 Sep 2020 10:44)  HR: 78 (25 Sep 2020 14:00) (60 - 84)  BP: 91/60 (25 Sep 2020 14:00) (53/34 - 109/73)  BP(mean): --  RR: 16 (25 Sep 2020 14:00) (16 - 18)  SpO2: 98% (25 Sep 2020 14:00) (96% - 98%)      PHYSICAL EXAMINATION:  General: Comfortable, pleasant, pale  Neurologic:     -Mental Status: AAOx3. Speech is fluent, no dysarthria. Memory intact. Follows commands. Attention/concentration intact. Fund of knowledge appropriate.     -Cranial Nerves:          II: Visual fields are full to confrontation.          III, IV, VI: EOMI without nystagmus. PERRL b/l          V:  Facial sensation V1-V3 equal and intact           VII: Face is symmetric with normal eye closure and smile          VIII: Hearing is bilaterally intact to finger rub          IX, X: Uvula is midline and soft palate rises symmetrically          XI: Head turning and shoulder shrug are intact.          XII: Tongue protrudes to her left     -Motor: Normal bulk and tone. No involuntary movements (tremor, myoclonus, chorea, athetosis, dystonia)          Upper extremities: 5/5 b/l shoulder abduction, elbow flexion/extension, wrist flexion/extension, handgrip          Lower extremities: 5/5 b/l hip flexion, knee extension/flexion, plantar flexion, ankle dorsiflexion          No pronator drift. Rapid alternating movements intact and symmetric     -Sensation: Intact to light touch.       -Coordination: No dysmetria on finger-to-nose and rapid alternating hand movements intact     -Reflexes: 2+ and symmetric at biceps, triceps, brachioradialis, patellar, ankles      -Gait: able to stand, limited 2/2  area pain    LABS:                        10.3   10. )-----------( 255      ( 24 Sep 2020 23:10 )             31.6         135  |  104  |  13  ----------------------------<  108<H>  4.4   |  21<L>  |  0.81    Ca    8.6      24 Sep 2020 23:10    TPro  5.2<L>  /  Alb  2.6<L>  /  TBili  <0.2  /  DBili  x   /  AST  16  /  ALT  8<L>  /  AlkPhos  144<H>      Hemoglobin A1C:   Vitamin B12   PT/INR - ( 24 Sep 2020 23:10 )   PT: 10.6 sec;   INR: 0.88          PTT - ( 24 Sep 2020 23:10 )  PTT:29.5 sec  CAPILLARY BLOOD GLUCOSE      POCT Blood Glucose.: 114 mg/dL (24 Sep 2020 22:28)              Microbiology:      RADIOLOGY, EKG AND ADDITIONAL TESTS: Reviewed.           NEUROLOGY CONSULT    HPI: Patient is a 35y y.o. F now POD #1 s/p pLTCS for suspected macrosomia.    HOSPITAL COURSE: Pt w/  20 w/ recorded 1L blood loss. Pt w/ 3 episodes of pre-syncope/syncope events w/ hypotension as low as 53/34, s/p 2L LR    SUBJECTIVE: Pt says she is feeling okay. Denies any vertigo (room spinning) or associated dizziness w/ head movements. Says she had a couple of episodes where stood up from a sitting position and immediately felt pain from her  site, and started to feel her b/l vision start to dim and that's when she felt she was going to pass out. Denies any chest pain or palpitations during the episode. She also mentions an instance where she was sitting on the toilet and had just urinated, and passed out completely for a couple of seconds.  says after the episode, she was mentating well without any confusion or urinary/bowel incontinence/tongue biting. Pt mentions she has a hx of passing out 2x a long time ago, once in an elevator and once when she was going to have a needle put in her arm for a blood draw. Denies fever, chills, palpitations. Admits to eating/drinking well, and current vaginal bleeding.    REVIEW OF SYSTEMS: As above     MEDICATIONS  Home Medications:  PNV Prenatal oral tablet: 1 tab(s) orally once a day (23 Sep 2020 12:45)    MEDICATIONS  (STANDING):  acetaminophen   Tablet .. 975 milliGRAM(s) Oral every 6 hours  diphtheria/tetanus/pertussis (acellular) Vaccine (ADAcel) 0.5 milliLiter(s) IntraMuscular once  enoxaparin Injectable 40 milliGRAM(s) SubCutaneous every 24 hours  ibuprofen  Tablet. 600 milliGRAM(s) Oral every 6 hours  lactated ringers. 1000 milliLiter(s) (125 mL/Hr) IV Continuous <Continuous>  morphine PF Spinal 0.3 milliGRAM(s) IntraThecal. once  oxytocin Infusion 333.333 milliUNIT(s)/Min (1000 mL/Hr) IV Continuous <Continuous>    MEDICATIONS  (PRN):  dexAMETHasone  Injectable 4 milliGRAM(s) IV Push every 6 hours PRN Nausea  diphenhydrAMINE 25 milliGRAM(s) Oral every 6 hours PRN Itching  lanolin Ointment 1 Application(s) Topical every 6 hours PRN Sore Nipples  magnesium hydroxide Suspension 30 milliLiter(s) Oral two times a day PRN Constipation  naloxone Injectable 0.1 milliGRAM(s) IV Push every 3 minutes PRN For ANY of the following changes in patient status:  A. Breaths Per Minute LESS THAN 10, B. Oxygen saturation LESS THAN 90%, C. Sedation score of 6 for Stop After: 4 Times  ondansetron Injectable 4 milliGRAM(s) IV Push every 6 hours PRN Nausea  oxyCODONE    IR 5 milliGRAM(s) Oral every 3 hours PRN Moderate to Severe Pain (4-10)  oxyCODONE    IR 5 milliGRAM(s) Oral once PRN Moderate to Severe Pain (4-10)  simethicone 80 milliGRAM(s) Chew every 4 hours PRN Gas      FAMILY HISTORY:    SOCIAL HISTORY: negative for tobacco, alcohol, or ilicit drug use.    Allergies    No Known Allergies    Intolerances            Vital Signs Last 24 Hrs  T(C): 36.7 (25 Sep 2020 14:00), Max: 37.2 (25 Sep 2020 10:44)  T(F): 98.1 (25 Sep 2020 14:00), Max: 98.9 (25 Sep 2020 10:44)  HR: 78 (25 Sep 2020 14:00) (60 - 84)  BP: 91/60 (25 Sep 2020 14:00) (53/34 - 109/73)  BP(mean): --  RR: 16 (25 Sep 2020 14:00) (16 - 18)  SpO2: 98% (25 Sep 2020 14:00) (96% - 98%)      PHYSICAL EXAMINATION:  General: Comfortable, pleasant, pale  Neurologic:     -Mental Status: AAOx3. Speech is fluent, no dysarthria. Memory intact. Follows commands. Attention/concentration intact. Fund of knowledge appropriate.     -Cranial Nerves:          II: Visual fields are full to confrontation.          III, IV, VI: EOMI without nystagmus. PERRL b/l          V:  Facial sensation V1-V3 equal and intact           VII: Face is symmetric with normal eye closure and smile          VIII: Hearing is bilaterally intact to finger rub          IX, X: Uvula is midline and soft palate rises symmetrically          XI: Head turning and shoulder shrug are intact.          XII: Tongue midline     -Motor: Normal bulk and tone. No involuntary movements (tremor, myoclonus, chorea, athetosis, dystonia)          Upper extremities: 5/5 b/l shoulder abduction, elbow flexion/extension, wrist flexion/extension, handgrip          Lower extremities: 5/5 b/l hip flexion, knee extension/flexion, plantar flexion, ankle dorsiflexion          No pronator drift. Rapid alternating movements intact and symmetric     -Sensation: Intact to light touch.       -Coordination: No dysmetria on finger-to-nose and rapid alternating hand movements intact     -Reflexes: 2+ and symmetric at biceps, triceps, brachioradialis, patellar, ankles      -Gait: able to stand, limited 2/2  area pain    LABS:                        10.3   10. )-----------( 255      ( 24 Sep 2020 23:10 )             31.6         135  |  104  |  13  ----------------------------<  108<H>  4.4   |  21<L>  |  0.81    Ca    8.6      24 Sep 2020 23:10    TPro  5.2<L>  /  Alb  2.6<L>  /  TBili  <0.2  /  DBili  x   /  AST  16  /  ALT  8<L>  /  AlkPhos  144<H>      Hemoglobin A1C:   Vitamin B12   PT/INR - ( 24 Sep 2020 23:10 )   PT: 10.6 sec;   INR: 0.88          PTT - ( 24 Sep 2020 23:10 )  PTT:29.5 sec  CAPILLARY BLOOD GLUCOSE      POCT Blood Glucose.: 114 mg/dL (24 Sep 2020 22:28)              Microbiology:      RADIOLOGY, EKG AND ADDITIONAL TESTS: Reviewed.

## 2020-09-25 NOTE — PROGRESS NOTE ADULT - SUBJECTIVE AND OBJECTIVE BOX
Patient is a 35y y.o. F now POD #2 s/p pLTCS for suspected macrosomia w/ postpartum course complicated by low BPs and syncope    Patient was evaluated at bedside this AM. Overnight had an episode of syncope while attempting to walk to the bathroom Pain is well-controlled with PO pain medications. She has been voiding spontaneously on a bed pan. She endorses decreasing vaginal bleeding. Passing gas. She does not feel as though she will be ready to try again today, but does not endorse dizziness or lightheadedness now.    Vital Signs Last 24 Hrs  T(C): 36.7 (25 Sep 2020 02:00), Max: 37.1 (24 Sep 2020 18:00)  T(F): 98.1 (25 Sep 2020 02:00), Max: 98.8 (24 Sep 2020 18:00)  HR: 68 (25 Sep 2020 02:00) (60 - 84)  BP: 102/65 (25 Sep 2020 02:00) (53/34 - 102/65)  BP(mean): --  RR: 18 (25 Sep 2020 02:00) (17 - 18)  SpO2: 96% (25 Sep 2020 02:00) (95% - 97%)    Physical Exam  Gen: Well-appearing. No acute distress. Resting comfortably in bed.  Resp: Breathing comfortably on RA.  Abd: Soft, non-tender, non-distended. Uterus firm at umbilicus.  Incision: Steri strips C/D/I  Extremities: No calf tenderness.    Labs                          10.3   10.25 )-----------( 255      ( 24 Sep 2020 23:10 )             31.6     09-24    135  |  104  |  13  ----------------------------<  108<H>  4.4   |  21<L>  |  0.81    Ca    8.6      24 Sep 2020 23:10    TPro  5.2<L>  /  Alb  2.6<L>  /  TBili  <0.2  /  DBili  x   /  AST  16  /  ALT  8<L>  /  AlkPhos  144<H>  09-24    PT/INR - ( 24 Sep 2020 23:10 )   PT: 10.6 sec;   INR: 0.88          PTT - ( 24 Sep 2020 23:10 )  PTT:29.5 sec        09-23-20 @ 07:01  -  09-24-20 @ 07:00  --------------------------------------------------------  IN: 3800 mL / OUT: 2285 mL / NET: 1515 mL    09-24-20 @ 07:01  -  09-25-20 @ 06:19  --------------------------------------------------------  IN: 0 mL / OUT: 900 mL / NET: -900 mL

## 2020-09-25 NOTE — CONSULT NOTE ADULT - ASSESSMENT
35 y.o. F no PMH s/p  20 w/ 3 episodes of pre-syncope/syncope. Neurology consulted for evaluation of possible positional vertigo    #Positional vertigo: pt denies vertigo symptoms (room spinning/nausea/vomiting) or any associated dizziness w/ head movements.     #Syncope: pt w/ pre-syncopal/syncopal episode which she described as vision dimming and lightheadedness w/ sitting to standing/ pain while standing/sitting on the toilet. Most likely 2/2 general hypotension/orthostatic hypotension (pt pale w/ recorded blood pressure as low as 53/34 in the setting of active heavy vaginal bleeding, describes dimming of visual field and lightheadedness) vs vasovagal (presyncope w/  pain while standing, hx of passing out w/ blood draw). Less likely neurological as neuro exam WNL (tongue does deviate slightly to left, however no other deficits).    Recommendations:  INCOMPLETE-TO BE DISCUSSED W/ ATTENDING 35 y.o. F no PMH s/p  20 w/ 3 episodes of pre-syncope/syncope. Neurology consulted for evaluation of possible positional vertigo    #Positional vertigo: pt denies vertigo symptoms (room spinning/nausea/vomiting) or any associated dizziness w/ head movements.     #Syncope: pt w/ pre-syncopal/syncopal episode which she described as vision dimming and lightheadedness w/ sitting to standing/ pain while standing/sitting on the toilet. Most likely 2/2 general hypotension/orthostatic hypotension (pt pale w/ recorded blood pressure as low as 53/34 in the setting of active heavy vaginal bleeding, describes dimming of visual field and lightheadedness) vs vasovagal (presyncope w/  pain while standing, hx of passing out w/ blood draw). Less likely neurological as neuro exam WNL      Recommendations:  -c/w blood pressure control per primary team  -monitor orthostatics  -will sign off, reconsult as needed    Discussed w/ attending 35 y.o. F no PMH s/p  20 w/ 3 episodes of pre-syncope/syncope. Neurology consulted for evaluation of possible positional vertigo    pt does not have vertigo. pt had orthostatic hypotension yesterday leading to presyncopal and syncopal episodes. today denies dizziness. no other neuro sx. management of hypotension as per primary team. please call with questions.

## 2020-09-25 NOTE — PROGRESS NOTE ADULT - ASSESSMENT
Assessment/Plan    35y y.o. now POD#2  s/p pLTCS c/b PPH d/t atony (EBL 1000). AfVSS. She had 2 prior episodes of presyncope before her syncopal episode this AM and has already received 2L LR bolus. CBC since her  has been stable at 10.5 (baseline 12.5) with low baseline BPs.    1. Syncope  - Labs WNL  - Bed pan use  - BP higher at 102/65 this AM  - CTM blood pressures    1. Pain  - Well-controlled on Motrin and Tylenol ATC    2. GI  - Tolerating regular diet without n/v  - Senna PRN    3.   - Voiding spontaneously without difficulty/pain        Minerva Lawton MD PGY1

## 2020-09-26 ENCOUNTER — TRANSCRIPTION ENCOUNTER (OUTPATIENT)
Age: 35
End: 2020-09-26

## 2020-09-26 RX ORDER — OXYCODONE HYDROCHLORIDE 5 MG/1
5 TABLET ORAL
Refills: 0 | Status: DISCONTINUED | OUTPATIENT
Start: 2020-09-26 | End: 2020-09-27

## 2020-09-26 RX ADMIN — Medication 975 MILLIGRAM(S): at 21:20

## 2020-09-26 RX ADMIN — Medication 975 MILLIGRAM(S): at 19:06

## 2020-09-26 RX ADMIN — Medication 600 MILLIGRAM(S): at 06:17

## 2020-09-26 RX ADMIN — ENOXAPARIN SODIUM 40 MILLIGRAM(S): 100 INJECTION SUBCUTANEOUS at 15:24

## 2020-09-26 RX ADMIN — Medication 975 MILLIGRAM(S): at 15:21

## 2020-09-26 RX ADMIN — Medication 975 MILLIGRAM(S): at 00:30

## 2020-09-26 RX ADMIN — Medication 600 MILLIGRAM(S): at 19:07

## 2020-09-26 RX ADMIN — Medication 600 MILLIGRAM(S): at 23:43

## 2020-09-26 RX ADMIN — Medication 600 MILLIGRAM(S): at 12:18

## 2020-09-26 RX ADMIN — Medication 975 MILLIGRAM(S): at 10:30

## 2020-09-26 RX ADMIN — Medication 975 MILLIGRAM(S): at 22:00

## 2020-09-26 RX ADMIN — Medication 975 MILLIGRAM(S): at 16:08

## 2020-09-26 RX ADMIN — OXYCODONE HYDROCHLORIDE 5 MILLIGRAM(S): 5 TABLET ORAL at 03:03

## 2020-09-26 RX ADMIN — Medication 600 MILLIGRAM(S): at 18:13

## 2020-09-26 RX ADMIN — Medication 975 MILLIGRAM(S): at 09:52

## 2020-09-26 RX ADMIN — Medication 600 MILLIGRAM(S): at 13:00

## 2020-09-26 NOTE — PROGRESS NOTE ADULT - ASSESSMENT
Assessment/Plan    35y y.o. now POD#3  s/p pLTCS c/b PPH d/t atony (EBL 1000). AfVSS. She has had 3 prior episodes of presyncope  and eceived 3L LR bolus. CBC since her  has been stable at 10.5 (baseline 12.5) with low baseline BPs.    1. Syncope  - Labs WNL, normal glucose and electrolytes, normal bp and orthostatics, nml ekg  - Bed pan use  - CTM blood pressures  - Neurology recs suggest orthostatic hypotension    1. Pain  - Well-controlled on Motrin and Tylenol ATC    2. GI  - Tolerating regular diet without n/v  - Senna PRN    3.   - Voiding spontaneously without difficulty/pain

## 2020-09-26 NOTE — PROGRESS NOTE ADULT - SUBJECTIVE AND OBJECTIVE BOX
Patient is a 35y y.o. F now POD #3 s/p pLTCS for suspected macrosomia w/ postpartum course complicated by low BPs and syncope    Patient was evaluated at bedside this AM. No events overnight. Pain is well-controlled with PO pain medications. She has been voiding spontaneously on a bed pan. She endorses decreasing vaginal bleeding. Passing gas. Pt had a bowel movement this morning. Not reporting dizziness or lightheadedness.    Vital Signs Last 24 Hrs  T(C): 36.7 (25 Sep 2020 02:00), Max: 37.1 (24 Sep 2020 18:00)  T(F): 98.1 (25 Sep 2020 02:00), Max: 98.8 (24 Sep 2020 18:00)  HR: 68 (25 Sep 2020 02:00) (60 - 84)  BP: 102/65 (25 Sep 2020 02:00) (53/34 - 102/65)  RR: 18 (25 Sep 2020 02:00) (17 - 18)  SpO2: 96% (25 Sep 2020 02:00) (95% - 97%)    Physical Exam  Gen: Well-appearing. No acute distress. Resting comfortably in bed.  Resp: Breathing comfortably on RA.  Abd: Soft, non-tender, non-distended. Uterus firm at umbilicus.  Incision: Steri strips C/D/I  Extremities: No calf tenderness.    Labs                          10.3   10.25 )-----------( 255      ( 24 Sep 2020 23:10 )             31.6     09-24    135  |  104  |  13  ----------------------------<  108<H>  4.4   |  21<L>  |  0.81    Ca    8.6      24 Sep 2020 23:10    TPro  5.2<L>  /  Alb  2.6<L>  /  TBili  <0.2  /  DBili  x   /  AST  16  /  ALT  8<L>  /  AlkPhos  144<H>  09-24    PT/INR - ( 24 Sep 2020 23:10 )   PT: 10.6 sec;   INR: 0.88          PTT - ( 24 Sep 2020 23:10 )  PTT:29.5 sec        09-23-20 @ 07:01  -  09-24-20 @ 07:00  --------------------------------------------------------  IN: 3800 mL / OUT: 2285 mL / NET: 1515 mL    09-24-20 @ 07:01  -  09-25-20 @ 06:19  --------------------------------------------------------  IN: 0 mL / OUT: 900 mL / NET: -900 mL

## 2020-09-27 VITALS
SYSTOLIC BLOOD PRESSURE: 94 MMHG | DIASTOLIC BLOOD PRESSURE: 59 MMHG | OXYGEN SATURATION: 97 % | RESPIRATION RATE: 18 BRPM | TEMPERATURE: 98 F | HEART RATE: 80 BPM

## 2020-09-27 PROCEDURE — 80053 COMPREHEN METABOLIC PANEL: CPT

## 2020-09-27 PROCEDURE — 82962 GLUCOSE BLOOD TEST: CPT

## 2020-09-27 PROCEDURE — 88307 TISSUE EXAM BY PATHOLOGIST: CPT

## 2020-09-27 PROCEDURE — 85610 PROTHROMBIN TIME: CPT

## 2020-09-27 PROCEDURE — 85384 FIBRINOGEN ACTIVITY: CPT

## 2020-09-27 PROCEDURE — 86780 TREPONEMA PALLIDUM: CPT

## 2020-09-27 PROCEDURE — 85027 COMPLETE CBC AUTOMATED: CPT

## 2020-09-27 PROCEDURE — 85730 THROMBOPLASTIN TIME PARTIAL: CPT

## 2020-09-27 PROCEDURE — 36415 COLL VENOUS BLD VENIPUNCTURE: CPT

## 2020-09-27 PROCEDURE — 86769 SARS-COV-2 COVID-19 ANTIBODY: CPT

## 2020-09-27 PROCEDURE — 85025 COMPLETE CBC W/AUTO DIFF WBC: CPT

## 2020-09-27 RX ADMIN — Medication 600 MILLIGRAM(S): at 12:29

## 2020-09-27 RX ADMIN — Medication 975 MILLIGRAM(S): at 10:04

## 2020-09-27 RX ADMIN — Medication 600 MILLIGRAM(S): at 07:30

## 2020-09-27 RX ADMIN — ENOXAPARIN SODIUM 40 MILLIGRAM(S): 100 INJECTION SUBCUTANEOUS at 15:16

## 2020-09-27 RX ADMIN — Medication 600 MILLIGRAM(S): at 06:34

## 2020-09-27 RX ADMIN — Medication 975 MILLIGRAM(S): at 15:16

## 2020-09-27 RX ADMIN — Medication 975 MILLIGRAM(S): at 03:15

## 2020-09-27 RX ADMIN — Medication 975 MILLIGRAM(S): at 10:41

## 2020-09-27 RX ADMIN — Medication 975 MILLIGRAM(S): at 02:28

## 2020-09-27 RX ADMIN — Medication 975 MILLIGRAM(S): at 15:15

## 2020-09-27 RX ADMIN — Medication 600 MILLIGRAM(S): at 00:30

## 2020-09-27 RX ADMIN — Medication 600 MILLIGRAM(S): at 12:26

## 2020-09-27 NOTE — PROGRESS NOTE ADULT - SUBJECTIVE AND OBJECTIVE BOX
Patient is a 35y y.o. F now POD #4 s/p pLTCS for suspected macrosomia w/ postpartum course complicated by low BPs and syncope    NAEO. Patient was evaluated at bedside this AM. Pain is well-controlled with PO pain medications. She has been ambulating to the bathroom and voiding spontaneously. She endorses decreasing vaginal bleeding. Passing gas.    Vital Signs Last 24 Hrs  T(C): 36.8 (27 Sep 2020 06:00), Max: 37.1 (26 Sep 2020 09:48)  T(F): 98.2 (27 Sep 2020 06:00), Max: 98.7 (26 Sep 2020 09:48)  HR: 81 (27 Sep 2020 06:00) (73 - 86)  BP: 109/72 (27 Sep 2020 06:00) (94/61 - 109/72)  BP(mean): --  RR: 18 (27 Sep 2020 06:00) (18 - 18)  SpO2: 98% (27 Sep 2020 06:00) (96% - 99%)    Physical Exam  Gen: Well-appearing. No acute distress. Resting comfortably in bed.  Resp: Breathing comfortably on RA.  Abd: Soft, non-tender, non-distended. Uterus firm at umbilicus.  Incision: Steri strips C/D/I  Extremities: No calf tenderness.    Labs

## 2020-09-27 NOTE — DISCHARGE NOTE OB - CARE PLAN
Principal Discharge DX:	Vaginal delivery  Goal:	pp recovery  Assessment and plan of treatment:	advil 4 tabs every 8 hrs as needed. follow 2 weeks in office

## 2020-09-27 NOTE — PROGRESS NOTE ADULT - ATTENDING COMMENTS
doing well  dc to home  questions answered
hb stable, normal glc and lytes, normal bp and orthostatics, nl ekg  pt endorses vertigo and syncope each time she stands up  will consult neuro  oob with assistance only
plan to dc tomorrow  doing well

## 2020-09-27 NOTE — DISCHARGE NOTE OB - PATIENT PORTAL LINK FT
You can access the FollowMyHealth Patient Portal offered by [facility] by registering at the following website: [email or web address]. By joining [facility]’s FollowMyHealth portal, you will also be able to view your health information using other applications (apps) compatible with our system.

## 2020-09-27 NOTE — PROGRESS NOTE ADULT - ASSESSMENT
35y y.o. now POD#4  s/p pLTCS c/b PPH d/t atony (EBL 1000). AfVSS with low baseline BPs. Syncope resovled.  Anticipate discharge today.    1. Syncope (resolved)  - 3 prior episodes of presyncope and received 3L LR bolus  - CBC stable since   - Patient walking to the bathroom to void and stool    2. Pain  - Well-controlled on Motrin and Tylenol ATC    3. GI  - Tolerating regular diet without n/v  - Senna PRN    4.   - Voiding spontaneously without difficulty/pain      Minerva Lawton MD PGY1

## 2020-09-27 NOTE — DISCHARGE NOTE OB - CARE PROVIDER_API CALL
OtilianszStephanie Rodgers  OBSTETRICS AND GYNECOLOGY  [address] [place] [address]  Phone: [phone]  Fax: [phone]  Follow Up Time:

## 2020-09-27 NOTE — DISCHARGE NOTE OB - MEDICATION SUMMARY - MEDICATIONS TO STOP TAKING
I will STOP taking the medications listed below when I get home from the hospital:  None
DMII, chronic on home Metformin and glipizide. Hold home meds  Low dose Humalog coverage scale.  Accuchecks qAC and qHS  Hypoglycemia protocol

## 2020-09-30 LAB — SURGICAL PATHOLOGY STUDY: SIGNIFICANT CHANGE UP

## 2020-10-02 DIAGNOSIS — R55 SYNCOPE AND COLLAPSE: ICD-10-CM

## 2020-10-02 DIAGNOSIS — O99.89 OTHER SPECIFIED DISEASES AND CONDITIONS COMPLICATING PREGNANCY, CHILDBIRTH AND THE PUERPERIUM: ICD-10-CM

## 2020-10-02 DIAGNOSIS — Z79.899 OTHER LONG TERM (CURRENT) DRUG THERAPY: ICD-10-CM

## 2020-10-02 DIAGNOSIS — I95.1 ORTHOSTATIC HYPOTENSION: ICD-10-CM

## 2020-10-02 DIAGNOSIS — Z3A.40 40 WEEKS GESTATION OF PREGNANCY: ICD-10-CM

## 2020-10-02 DIAGNOSIS — O36.63X0 MATERNAL CARE FOR EXCESSIVE FETAL GROWTH, THIRD TRIMESTER, NOT APPLICABLE OR UNSPECIFIED: ICD-10-CM

## 2020-10-02 DIAGNOSIS — Z34.80 ENCOUNTER FOR SUPERVISION OF OTHER NORMAL PREGNANCY, UNSPECIFIED TRIMESTER: ICD-10-CM
